# Patient Record
Sex: FEMALE | Race: WHITE | NOT HISPANIC OR LATINO | URBAN - METROPOLITAN AREA
[De-identification: names, ages, dates, MRNs, and addresses within clinical notes are randomized per-mention and may not be internally consistent; named-entity substitution may affect disease eponyms.]

---

## 2017-06-05 ENCOUNTER — HOSPITAL ENCOUNTER (OUTPATIENT)
Dept: PHYSICAL MEDICINE AND REHAB | Facility: CLINIC | Age: 32
Discharge: HOME | End: 2017-06-05
Attending: PHYSICAL MEDICINE & REHABILITATION

## 2017-06-05 DIAGNOSIS — M79.18 MYOFASCIAL MUSCLE PAIN: ICD-10-CM

## 2017-06-05 DIAGNOSIS — M25.552 HIP PAIN, BILATERAL: ICD-10-CM

## 2017-06-05 DIAGNOSIS — M51.369 DEGENERATIVE LUMBAR DISC: ICD-10-CM

## 2017-06-05 DIAGNOSIS — M54.50 LUMBAR SPINE PAIN: ICD-10-CM

## 2017-06-05 DIAGNOSIS — G47.9 SLEEP DIFFICULTIES: ICD-10-CM

## 2017-06-05 DIAGNOSIS — R20.2 LEFT LEG PARESTHESIAS: ICD-10-CM

## 2017-06-05 DIAGNOSIS — M25.551 HIP PAIN, BILATERAL: ICD-10-CM

## 2017-06-06 ENCOUNTER — RECORDS - HEALTHEAST (OUTPATIENT)
Dept: ADMINISTRATIVE | Facility: OTHER | Age: 32
End: 2017-06-06

## 2017-06-20 ENCOUNTER — HOSPITAL ENCOUNTER (OUTPATIENT)
Dept: RADIOLOGY | Facility: HOSPITAL | Age: 32
Discharge: HOME | End: 2017-06-20
Attending: PHYSICAL MEDICINE & REHABILITATION

## 2017-06-20 DIAGNOSIS — M54.50 LUMBAR SPINE PAIN: ICD-10-CM

## 2017-06-22 ENCOUNTER — COMMUNICATION - HEALTHEAST (OUTPATIENT)
Dept: PHYSICAL MEDICINE AND REHAB | Facility: CLINIC | Age: 32
End: 2017-06-22

## 2017-06-22 ENCOUNTER — HOSPITAL ENCOUNTER (OUTPATIENT)
Dept: PHYSICAL MEDICINE AND REHAB | Facility: CLINIC | Age: 32
Discharge: HOME | End: 2017-06-22
Attending: PHYSICAL MEDICINE & REHABILITATION

## 2017-06-22 DIAGNOSIS — R20.2 LEFT LEG PARESTHESIAS: ICD-10-CM

## 2017-06-22 DIAGNOSIS — G47.9 SLEEP DIFFICULTIES: ICD-10-CM

## 2017-06-22 DIAGNOSIS — M51.9 ANNULAR DISC TEAR: ICD-10-CM

## 2017-06-22 DIAGNOSIS — R20.2 NUMBNESS AND TINGLING IN LEFT ARM: ICD-10-CM

## 2017-06-22 DIAGNOSIS — M54.50 LUMBAR SPINE PAIN: ICD-10-CM

## 2017-06-22 DIAGNOSIS — M79.18 MYOFASCIAL PAIN: ICD-10-CM

## 2017-06-22 DIAGNOSIS — M51.369 DEGENERATIVE LUMBAR DISC: ICD-10-CM

## 2017-06-22 DIAGNOSIS — R20.0 NUMBNESS AND TINGLING IN LEFT ARM: ICD-10-CM

## 2017-06-22 ASSESSMENT — MIFFLIN-ST. JEOR: SCORE: 1642.5

## 2017-07-10 ENCOUNTER — RECORDS - HEALTHEAST (OUTPATIENT)
Dept: RADIOLOGY | Facility: CLINIC | Age: 32
End: 2017-07-10

## 2017-07-10 ENCOUNTER — OFFICE VISIT - HEALTHEAST (OUTPATIENT)
Dept: PHYSICAL THERAPY | Facility: REHABILITATION | Age: 32
End: 2017-07-10

## 2017-07-10 DIAGNOSIS — M51.9 ANNULAR DISC TEAR: ICD-10-CM

## 2017-07-10 DIAGNOSIS — M54.10 RADICULAR SYNDROME OF LOWER LIMBS: ICD-10-CM

## 2017-07-10 DIAGNOSIS — M51.369 DEGENERATIVE LUMBAR DISC: ICD-10-CM

## 2017-07-10 DIAGNOSIS — M51.9 ANNULAR TEAR OF INTERVERTEBRAL DISC: ICD-10-CM

## 2017-07-10 DIAGNOSIS — M54.50 LUMBAR SPINE PAIN: ICD-10-CM

## 2017-07-10 DIAGNOSIS — M79.18 MYOFASCIAL PAIN: ICD-10-CM

## 2017-07-10 DIAGNOSIS — M51.369 DEGENERATION OF LUMBAR INTERVERTEBRAL DISC: ICD-10-CM

## 2017-07-14 ENCOUNTER — HOSPITAL ENCOUNTER (OUTPATIENT)
Dept: PHYSICAL MEDICINE AND REHAB | Facility: CLINIC | Age: 32
Discharge: HOME | End: 2017-07-14
Attending: PHYSICAL MEDICINE & REHABILITATION

## 2017-07-14 DIAGNOSIS — G47.9 SLEEP DIFFICULTIES: ICD-10-CM

## 2017-07-17 ENCOUNTER — HOSPITAL ENCOUNTER (OUTPATIENT)
Dept: PHYSICAL MEDICINE AND REHAB | Facility: CLINIC | Age: 32
Discharge: HOME | End: 2017-07-17
Attending: PHYSICAL MEDICINE & REHABILITATION

## 2017-07-17 DIAGNOSIS — M51.369 DEGENERATIVE LUMBAR DISC: ICD-10-CM

## 2017-07-17 DIAGNOSIS — M99.03 SOMATIC DYSFUNCTION OF LUMBAR REGION: ICD-10-CM

## 2017-07-17 DIAGNOSIS — M99.06 SOMATIC DYSFUNCTION OF LOWER EXTREMITY: ICD-10-CM

## 2017-07-17 DIAGNOSIS — M99.05 SOMATIC DYSFUNCTION OF PELVIS REGION: ICD-10-CM

## 2017-07-17 DIAGNOSIS — M54.50 LUMBAR SPINE PAIN: ICD-10-CM

## 2017-07-17 DIAGNOSIS — M99.04 SOMATIC DYSFUNCTION OF SACROILIAC JOINT: ICD-10-CM

## 2017-07-17 DIAGNOSIS — M99.02 SOMATIC DYSFUNCTION OF THORACIC REGION: ICD-10-CM

## 2017-07-17 ASSESSMENT — MIFFLIN-ST. JEOR: SCORE: 1628.89

## 2017-07-19 ENCOUNTER — OFFICE VISIT - HEALTHEAST (OUTPATIENT)
Dept: PHYSICAL THERAPY | Facility: REHABILITATION | Age: 32
End: 2017-07-19

## 2017-07-19 DIAGNOSIS — M51.369 DEGENERATION OF LUMBAR INTERVERTEBRAL DISC: ICD-10-CM

## 2017-07-19 DIAGNOSIS — M54.10 RADICULAR SYNDROME OF LOWER LIMBS: ICD-10-CM

## 2017-07-19 DIAGNOSIS — M51.369 DEGENERATIVE LUMBAR DISC: ICD-10-CM

## 2017-07-19 DIAGNOSIS — M51.9 ANNULAR TEAR OF INTERVERTEBRAL DISC: ICD-10-CM

## 2017-07-19 DIAGNOSIS — M79.18 MYOFASCIAL PAIN: ICD-10-CM

## 2017-07-26 ENCOUNTER — OFFICE VISIT - HEALTHEAST (OUTPATIENT)
Dept: PHYSICAL THERAPY | Facility: REHABILITATION | Age: 32
End: 2017-07-26

## 2017-07-26 DIAGNOSIS — M51.369 DEGENERATIVE LUMBAR DISC: ICD-10-CM

## 2017-07-26 DIAGNOSIS — M51.369 DEGENERATION OF LUMBAR INTERVERTEBRAL DISC: ICD-10-CM

## 2017-07-26 DIAGNOSIS — M51.9 ANNULAR TEAR OF INTERVERTEBRAL DISC: ICD-10-CM

## 2017-07-26 DIAGNOSIS — M54.10 RADICULAR SYNDROME OF LOWER LIMBS: ICD-10-CM

## 2017-07-26 DIAGNOSIS — M79.18 MYOFASCIAL PAIN: ICD-10-CM

## 2017-08-04 ENCOUNTER — RECORDS - HEALTHEAST (OUTPATIENT)
Dept: RADIOLOGY | Facility: CLINIC | Age: 32
End: 2017-08-04

## 2017-08-09 ENCOUNTER — OFFICE VISIT - HEALTHEAST (OUTPATIENT)
Dept: PHYSICAL THERAPY | Facility: REHABILITATION | Age: 32
End: 2017-08-09

## 2017-08-09 DIAGNOSIS — M54.10 RADICULAR SYNDROME OF LOWER LIMBS: ICD-10-CM

## 2017-08-09 DIAGNOSIS — M79.18 MYOFASCIAL PAIN: ICD-10-CM

## 2017-08-09 DIAGNOSIS — M51.9 ANNULAR TEAR OF INTERVERTEBRAL DISC: ICD-10-CM

## 2017-08-09 DIAGNOSIS — M51.369 DEGENERATION OF LUMBAR INTERVERTEBRAL DISC: ICD-10-CM

## 2017-08-09 DIAGNOSIS — M51.369 DEGENERATIVE LUMBAR DISC: ICD-10-CM

## 2017-08-23 ENCOUNTER — OFFICE VISIT - HEALTHEAST (OUTPATIENT)
Dept: PHYSICAL THERAPY | Facility: REHABILITATION | Age: 32
End: 2017-08-23

## 2017-08-23 ENCOUNTER — COMMUNICATION - HEALTHEAST (OUTPATIENT)
Dept: PHYSICAL MEDICINE AND REHAB | Facility: CLINIC | Age: 32
End: 2017-08-23

## 2017-08-23 DIAGNOSIS — M79.18 MYOFASCIAL PAIN: ICD-10-CM

## 2017-08-23 DIAGNOSIS — M51.9 ANNULAR TEAR OF INTERVERTEBRAL DISC: ICD-10-CM

## 2017-08-23 DIAGNOSIS — M51.369 DEGENERATION OF LUMBAR INTERVERTEBRAL DISC: ICD-10-CM

## 2017-08-23 DIAGNOSIS — M54.10 RADICULAR SYNDROME OF LOWER LIMBS: ICD-10-CM

## 2017-08-23 DIAGNOSIS — G47.9 SLEEP DIFFICULTIES: ICD-10-CM

## 2017-08-23 DIAGNOSIS — M51.369 DEGENERATIVE LUMBAR DISC: ICD-10-CM

## 2017-08-23 DIAGNOSIS — M51.9 ANNULAR DISC TEAR: ICD-10-CM

## 2017-09-06 ENCOUNTER — COMMUNICATION - HEALTHEAST (OUTPATIENT)
Dept: PHYSICAL MEDICINE AND REHAB | Facility: CLINIC | Age: 32
End: 2017-09-06

## 2017-09-07 ENCOUNTER — HOSPITAL ENCOUNTER (OUTPATIENT)
Dept: PHYSICAL MEDICINE AND REHAB | Facility: CLINIC | Age: 32
Discharge: HOME | End: 2017-09-07
Attending: PHYSICAL MEDICINE & REHABILITATION

## 2017-09-07 DIAGNOSIS — M99.06 SOMATIC DYSFUNCTION OF LOWER EXTREMITY: ICD-10-CM

## 2017-09-07 DIAGNOSIS — M99.04 SOMATIC DYSFUNCTION OF SACROILIAC JOINT: ICD-10-CM

## 2017-09-07 DIAGNOSIS — M99.05 SOMATIC DYSFUNCTION OF PELVIS REGION: ICD-10-CM

## 2017-09-07 DIAGNOSIS — M99.03 SOMATIC DYSFUNCTION OF LUMBAR REGION: ICD-10-CM

## 2017-09-07 DIAGNOSIS — M99.02 SOMATIC DYSFUNCTION OF THORACIC REGION: ICD-10-CM

## 2017-09-07 DIAGNOSIS — M79.18 MYOFASCIAL MUSCLE PAIN: ICD-10-CM

## 2017-09-07 DIAGNOSIS — M54.50 LUMBAR SPINE PAIN: ICD-10-CM

## 2017-09-07 DIAGNOSIS — M51.369 DEGENERATIVE LUMBAR DISC: ICD-10-CM

## 2017-09-07 ASSESSMENT — MIFFLIN-ST. JEOR: SCORE: 1628.89

## 2017-09-27 ENCOUNTER — OFFICE VISIT - HEALTHEAST (OUTPATIENT)
Dept: PHYSICAL THERAPY | Facility: REHABILITATION | Age: 32
End: 2017-09-27

## 2017-09-27 DIAGNOSIS — M51.9 ANNULAR TEAR OF INTERVERTEBRAL DISC: ICD-10-CM

## 2017-09-27 DIAGNOSIS — M79.18 MYOFASCIAL MUSCLE PAIN: ICD-10-CM

## 2017-09-27 DIAGNOSIS — M51.369 DEGENERATIVE LUMBAR DISC: ICD-10-CM

## 2017-09-27 DIAGNOSIS — M54.10 RADICULAR SYNDROME OF LOWER LIMBS: ICD-10-CM

## 2017-09-27 DIAGNOSIS — M51.369 DEGENERATION OF LUMBAR INTERVERTEBRAL DISC: ICD-10-CM

## 2017-09-27 DIAGNOSIS — M79.18 MYOFASCIAL PAIN: ICD-10-CM

## 2017-09-27 DIAGNOSIS — M54.50 LUMBAR SPINE PAIN: ICD-10-CM

## 2017-10-04 ENCOUNTER — OFFICE VISIT - HEALTHEAST (OUTPATIENT)
Dept: PHYSICAL THERAPY | Facility: REHABILITATION | Age: 32
End: 2017-10-04

## 2017-10-04 DIAGNOSIS — M51.369 DEGENERATION OF LUMBAR INTERVERTEBRAL DISC: ICD-10-CM

## 2017-10-04 DIAGNOSIS — M51.9 ANNULAR TEAR OF INTERVERTEBRAL DISC: ICD-10-CM

## 2017-10-04 DIAGNOSIS — M54.10 RADICULAR SYNDROME OF LOWER LIMBS: ICD-10-CM

## 2017-10-04 DIAGNOSIS — M79.18 MYOFASCIAL PAIN: ICD-10-CM

## 2017-10-04 DIAGNOSIS — M51.369 DEGENERATIVE LUMBAR DISC: ICD-10-CM

## 2017-10-11 ENCOUNTER — OFFICE VISIT - HEALTHEAST (OUTPATIENT)
Dept: PHYSICAL THERAPY | Facility: REHABILITATION | Age: 32
End: 2017-10-11

## 2017-10-11 DIAGNOSIS — M54.10 RADICULAR SYNDROME OF LOWER LIMBS: ICD-10-CM

## 2017-10-11 DIAGNOSIS — M51.9 ANNULAR TEAR OF INTERVERTEBRAL DISC: ICD-10-CM

## 2017-10-11 DIAGNOSIS — M51.369 DEGENERATION OF LUMBAR INTERVERTEBRAL DISC: ICD-10-CM

## 2017-10-11 DIAGNOSIS — M79.18 MYOFASCIAL PAIN: ICD-10-CM

## 2017-10-25 ENCOUNTER — OFFICE VISIT - HEALTHEAST (OUTPATIENT)
Dept: PHYSICAL THERAPY | Facility: REHABILITATION | Age: 32
End: 2017-10-25

## 2017-10-25 DIAGNOSIS — M51.9 ANNULAR TEAR OF INTERVERTEBRAL DISC: ICD-10-CM

## 2017-10-25 DIAGNOSIS — M54.10 RADICULAR SYNDROME OF LOWER LIMBS: ICD-10-CM

## 2017-10-25 DIAGNOSIS — M79.18 MYOFASCIAL PAIN: ICD-10-CM

## 2017-10-25 DIAGNOSIS — M51.369 DEGENERATION OF LUMBAR INTERVERTEBRAL DISC: ICD-10-CM

## 2020-07-21 ENCOUNTER — AMBULATORY - HEALTHEAST (OUTPATIENT)
Dept: SURGERY | Facility: CLINIC | Age: 35
End: 2020-07-21

## 2020-07-21 ENCOUNTER — RECORDS - HEALTHEAST (OUTPATIENT)
Dept: ADMINISTRATIVE | Facility: OTHER | Age: 35
End: 2020-07-21

## 2020-07-21 DIAGNOSIS — Z11.59 ENCOUNTER FOR SCREENING FOR OTHER VIRAL DISEASES: ICD-10-CM

## 2021-05-27 ENCOUNTER — RECORDS - HEALTHEAST (OUTPATIENT)
Dept: ADMINISTRATIVE | Facility: CLINIC | Age: 36
End: 2021-05-27

## 2021-05-29 ENCOUNTER — RECORDS - HEALTHEAST (OUTPATIENT)
Dept: ADMINISTRATIVE | Facility: CLINIC | Age: 36
End: 2021-05-29

## 2021-05-30 ENCOUNTER — RECORDS - HEALTHEAST (OUTPATIENT)
Dept: ADMINISTRATIVE | Facility: CLINIC | Age: 36
End: 2021-05-30

## 2021-05-30 VITALS — WEIGHT: 198 LBS | BODY MASS INDEX: 29.24 KG/M2

## 2021-05-31 VITALS — WEIGHT: 195 LBS | HEIGHT: 69 IN | BODY MASS INDEX: 28.88 KG/M2

## 2021-05-31 VITALS — BODY MASS INDEX: 28.88 KG/M2 | HEIGHT: 69 IN | WEIGHT: 195 LBS

## 2021-05-31 VITALS — WEIGHT: 198 LBS | HEIGHT: 69 IN | BODY MASS INDEX: 29.33 KG/M2

## 2021-06-11 NOTE — PROGRESS NOTES
[facility]   Spine Initial Evaluation    Patient Name: Casandra Hopkins  Date of evaluation: 7/10/2017  Date of surgery: 9/2012  Referral Diagnosis: Left sided low back and leg pain with annular tear  Referring provider: Antoni Enamorado DO  Visit Diagnosis:     ICD-10-CM    1. Radicular syndrome of lower limbs M54.10    2. Degeneration of lumbar intervertebral disc M51.36    3. Myofascial pain M79.1    4. Annular tear of intervertebral disc M51.9    5. Annular disc tear M51.9    6. Degenerative lumbar disc M51.36    7. Lumbar spine pain M54.5        Assessment:      Impairments in  pain, posture, ROM, joint mobility, strength, ADL's  Patient's signs and symptoms are consistent with Lumbar nerve derangemnt with postural dysfunction.  The POC is dynamic and will be modified on an ongoing basis.  Prognosis to achieve goals is  good     Goals:  Pt. will be independent with home exercise program in : 12 weeks  Pt. will have improved quality of sleep: waking less times/night;in 12 weeks  Patient will ascend / descend: stairs;with railing;with less pain;with less difficulty;in 12 weeks  Patient will transfer: sit/stand;supine/sit;for in/out of bed;for in/out of chair;with less pain;with less difficulty;in 12 weeks  No Data Recorded    Patient's expectations/goals are realistic    Barriers to Learning or Achieving Goals:  No Barriers.       Plan / Patient Instructions:        Plan of Care:   Communication with: Referral Source  Patient Related Instruction: Nature of Condition;Treatment plan and rationale;Basis of treatment;Posture;Expected outcome  Times per Week: 1-2  Number of Weeks: 12  Number of Visits: 12-14  Discharge Planning: independent in [facility]  Therapeutic Exercise: ROM;Stretching;Strengthening  Neuromuscular Reeducation: kinesio tape;balance/proprioception;TNE;postural restoration;core  Manual Therapy: soft tissue mobilization;myofascial release;joint mobilization  Modalities: electrical  "stimulation;TENS;cold pack;hot pack    Plan for next visit: Continue reduction in nerve tension, LA as able, prefers flexion based exercises     Subjective:         History of Present Illness:    Patient is a 32 year old female seen today with complaints of low back and left lower extremity pain.  Date of onset was insidious occurring on or around 9/2012 related to insidious onset of back pain after getting out of her car and having immediate spine surgery with Dr. Alaina vaughn 2. She reports a history of similar symptoms with multiple trials of PT including Med[facility], dry needling and HEP. She describes her previous level of function as normal until the surgery. Patient reports aggravating factors as sitting greater then 15-30 minutes or standing 15 minutes. Reports easing factors as \"moving around or short distance walking. Medications that arenot helpful as Topamax for nerve pain.    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    Patient Outcome Measures :    Modified Oswestry Low Back Pain Disablity Questionnaire  in %: 60   Scores range from 0-100%, where a score of 0% represents minimal pain and maximal function. The minimal clinically important difference is a score reduction of 12%.    Examination  1. Radicular syndrome of lower limbs     2. Degeneration of lumbar intervertebral disc     3. Myofascial pain     4. Annular tear of intervertebral disc     5. Annular disc tear     6. Degenerative lumbar disc     7. Lumbar spine pain       Precautions/Restrictions: None  Involved side: Left and Bilateral  Posture Observation: Decreased side stepping with PDM to left, decreased weight bearing left    Lumbar ROM:    Date:  "   *Indicate scale AROM   Lumbar Flexion 8 inches from floor   Lumbar Extension mod    Right Left   Lumbar Sidebending  <   Lumbar Rotation     Thoracic Flexion    Thoracic Extension    Thoracic Sidebending     Thoracic Rotation       Directional Preference Motion Testing:  flexion    Lower Extremity Strength:     Date:    LE strength/5 Right Left   Hip Flexion (L1-3) 5 5   Hip Extension (L5-S1)     Hip Abduction (L4-5) 5 5   Hip Adduction (L2-3)     Hip External Rotation 4 4   Hip Internal Rotation 4 3+   Knee Extension (L3-4) 5 4   Knee Flexion     Ankle Dorsiflexion (L4-5) 5 4   Great Toe Extension (L5)     Ankle Plantar flexion (S1)     Abdominals Grade        Lumbar Special Tests:     Lumbar Special Tests Right Left W[place] Tests Right  Left   Quadrant test   FAIR PROM 40 30   Gowers sign  + FAER PROM 50 36   Slump  + Adduction Drop test     Straight leg raise 70 65 Modified Irving     Crossover sign  Adduction Lift Test     Trunk extensor endurance test  Trunk Rotation     Prone instability test  Other:FABERE     SI distraction + Other:SLS/rotate       Sensation                      Reflex Testing/Neurological  Lumbar Dermatomes Right Left UE Reflexes Right Left   Iliac Crest and Groin (L1)   Biceps (C5-6)     Anterior Medial Thigh (L2)   Brachioradialis (C5-6)     Anterior Thigh, Medial Epicondyle Femur (L3)   Triceps (C7-8)     Lateral Thigh, Anterior Knee, Medial Leg/Malleolus (L4)   Román s test     Lateral Leg, Dorsal Foot (L5)   LE Reflexes     Lateral Foot (S1)   Patellar (L3-4) 2+ 2+   Posterior Leg (S2)   Achilles (S1-2) 2+ delayed   Other:Myotomes   Babinski Response       Palpation:+ left finger point test to SI   Exercises:  Exercise #1: supine,seated standing nerve sliders  Comment #1: 5-8 reps every two hours  Exercise #2: Sleep positions Supine or S/L with hip shift  Comment #2: sitting on SB at work        Treatment Today     TREATMENT MINUTES COMMENTS   Evaluation 25 Lumbar   Self-care/ Home  management 15 Sleeping positions and SB at work   Manual therapy 5 Manual lumbar LLE oscillations with nerve sliders, adduction, nerve hypersensitive with DF   Neuromuscular Re-education 15 Supine, seated, standing nerve sliders   Therapeutic Activity     Therapeutic Exercises     Gait training     Modality__________________                Total 60    Blank areas are intentional and mean the treatment did not include these items.              Cheryl Borges  7/10/2017  9:00 AM        PT Evaluation Code: (Please list factors)  Patient History/Comorbidities: lumbar radiculopathy with surgery x 2,   Examination: pain, impaired gait, decreased strength and ROM, nerve tension  Clinical Presentation: stable  Clinical Decision Making: low    Patient History/  Comorbidities Examination  (body structures and functions, activity limitations, and/or participation restrictions) Clinical Presentation Clinical Decision Making (Complexity)   No documented Comorbidities or personal factors 1-2 Elements Stable and/or uncomplicated Low   1-2 documented comorbidities or personal factor 3 Elements Evolving clinical presentation with changing characteristics Moderate   3-4 documented comorbidities or personal factors 4 or more Unstable and unpredictable High

## 2021-06-11 NOTE — PROGRESS NOTES
Patient presents for left lower extremity EMG.  She has lumbar spine pain had multiple surgeries.  She has gluteal and low back pain with left leg pain and paresthesias to the foot.  Status post L5-S1 microdiscectomy.    EMG/NCS  results: Please see scanned full report.    Comment NCS: Normal study  1.  Normal nerve conduction studies left lower extremity.    Comment EMG: Essentially study  1.  Normal needle EMG left lower extremity.  There are some borderline motor unit durations in the left medial gastrocnemius muscle.       Interpretation: Essentially study    1. There is no electrodiagnostic evidence of active lumbosacral radiculopathy, lumbosacral plexopathy, or focal neuropathy in the left lower extremity.  There are some borderline long motor unit durations of left medial gastrocnemius muscle.  This can be normal for the patient or could be consistent with a chronic and inactive S1 radiculopathy.    The testing was completed in its entirety by the physician.      It was our pleasure caring for your patient today, if there any questions or concerns please do not hesitate to contact us.

## 2021-06-11 NOTE — PROGRESS NOTES
New patient evaluation  --Hx of 2 lumbar surgery L5-S1 9/2012 and 12/20/12 by Dr. Diaz  --Pt also reports of getting multiple lumbar KRISHNA and multiple RF with [facility] and [facility]  --Today C/O mid low back pain, radiates down the B/L buttocks with pain/numbness in the entire left posterior leg to the left foot (ongoing since 2012), progressively  worsening x 1 month  --Rates pain 5/10  --Most recent MRI lumbar spine 10/27/16 with [facility]. [facility] Everywhere consent signed.   --See [facility] records in the media tab    Medication  --No pain meds now

## 2021-06-11 NOTE — PROGRESS NOTES
Patient arrived on time for her intake with this provider. As I described purpose of intake, she reported that she has a current psychotherapist whom she sees every other week and that they work on her depression, anxiety, and the intersection with her pain. She reports she is also taking psychotropic medication. Described the type of approaches provided here. Her current psychotherapist already does breathing, mindfulness, and EMDR with her and she wants to keep working with that therapist. Decided to not pursue the intake at this time. Provided her with handouts on the pain management series of classes, mindfulness, square breathing, and grounding skills. She is aware that if in the future she wants to change psychotherapists, she is more than welcome to reschedule this intake with this provider.    Time with patient: 12 minutes    No charge to patient.

## 2021-06-11 NOTE — PROGRESS NOTES
Assessment/Plan:      Diagnoses and all orders for this visit:    Lumbar spine pain    Degenerative lumbar disc    Somatic dysfunction of thoracic region    Somatic dysfunction of lumbar region    Somatic dysfunction of sacroiliac joint    Somatic dysfunction of pelvis region    Somatic dysfunction of lower extremity        Assessment:    1.  Chronic lumbar spine pain and left lower extremity pain and paresthesias.  Status post hemilaminectomy with microdiscectomy in September 2012 followed by recurrent disc herniation in December 2012 by Dr. Oswald.  Persistent pain since.   She does have degenerative disc disease at L4-5 and L5-S1 with annular tear at L4-5.  Some moderate foraminal stenosis on the left at L5-S1 no significant nerve compression at any level on MRI from October 2016.  Has had numerous treatments in the past including [facility] physical therapy, multiple injections including transforaminal epidurals, medial branch blocks, SI joints.  Wants to avoid surgery.       2.  Somatic dysfunctions of the thoracic spine, lumbar spine, sacrum, pelvis, lower extremities that contribute to the patient's pain complaints.   3. Pain across the lumbar spine gluteal region consistent with myofascial pain  4.  Poor sleep related to pain, stable      Discussion:    1.  We discussed diagnosis and treatment options.   we discussed her starting physical therapy and should continue for 4-6 visits.  She is doing some nerve glides.  2.  She had questions regarding home exercise.  This includes yoga or Pilates.  She will start 1 of these programs. I did discuss that modifying the exercises may be necessary for her.  3.  Continue Topamax.  She was taking this twice daily because side effects.  Will continue at 25 mg at bedtime.  4.  Osteopathic manipulative medicine today as planned.  Patient agrees to proceed.  Please see attached procedure note.    5.  Return to clinic 2-4 weeks    15 minutes were spent with this patient in  addition to any procedure with greater than 10 minutes in counseling and coordination of care.    It was our pleasure caring for your patient today, if there any questions or concerns please do not hesitate to contact us.      Subjective:   Patient ID: Casandra Hopkins is a 32 y.o. female.    History of Present Illness: Patient presents for evaluation of lumbar spine pain left leg pain and gluteal pain.  Symptoms have worsened some since last visit.  Pain is now across the gluteal tissues along the iliac crest.  Still has the pain on the left leg with numbness and tingling.  Pain is a 3/10 today 9/10 at worst.  Has been taking Topamax 25 mg at bedtime.  Increase this to twice a day and was having cognitive side effects during the day wondering if she can continue just at bedtime.  She does feel that the Topamax is quite helpful for her tingling in the leg.  She also has questions today regarding exercise.  She wants to start yoga or Pilates and is wondering what the best course would be.    She has started physical therapy doing some nerve glides and is scheduled for another 4-6 visits.  She is here for OMT today but has these questions as above.  Would like to proceed with OMT.    Imaging: MRI of the lumbar spine personally reviewed.  Degenerative disc disease L4-5 and L5-S1 with annular tear at L4-5.  Moderate foraminal stenosis on the left at L5-S1.    Review of systems: Some numbness and tingling left leg and weakness.  No bowel or bladder incontinence headache dizziness nausea vomiting blurred vision or balance changes.      Past Medical History:   Diagnosis Date     Anxiety      Depression        The following portions of the patient's history were reviewed and updated as appropriate: allergies, current medications, past family history, past medical history, past social history, past surgical history and problem list.      Objective:   Physical Exam:    Vitals:    07/17/17 0851   BP: 111/68   Pulse: 87        General: Alert and oriented with normal affect. Attention, knowledge, memory, and language are intact. No acute distress.   Eyes: Sclerae are clear.  Respirations: Unlabored. CV: No lower extremity edema.   Gait:  Nonantalgic    Sensation is intact to light touch throughout the   lower extremities.       Manual muscle testing reveals:  Right /Left out of 5     5/5 ankle plantar flexors  5/5 ankle dorsiflexors  5/5  EHL     Structural exam: . Thoracic spine  T12 rotated right sidebent right. Lumbar spine: L5 rotated left sidebent right. Pelvis: Right in flare, right innominate upslip, anterior inferior right innominate. Sacrum: Left on left forward sacral torsion. Lower extremity: Hypertonic hip flexors bilaterally left greater than right ,    Procedure:    After discussing the risks and benefits of osteopathic manipulative medicine, verbal consent was obtained. The somatic dysfunctions listed above were treated with the following techniques:   Thoracic spine: Muscle energy.  Lumbar spine: Muscle energy, soft tissue. Pelvis: Still technique and Isometrics, muscle energy. Sacrum: Myofascial release.  Lower extremities: Muscle energy.    The patient tolerated the procedure well and had improved range of motion in all areas treated prior to leaving the clinic.

## 2021-06-11 NOTE — PROGRESS NOTES
Assessment/Plan:      Diagnoses and all orders for this visit:    Lumbar spine pain  -     EMG - Clinic; Future; Expected date: 6/5/17  -     XR Lumbar Spine Flex and Ext 2 or 3 VWS; Future; Expected date: 6/5/17    Degenerative lumbar disc  -     EMG - Clinic; Future; Expected date: 6/5/17    Hip pain, bilateral    Myofascial muscle pain    Sleep difficulties  -     Ambulatory referral to [facility]    Left leg paresthesias  -     EMG - Clinic; Future; Expected date: 6/5/17        Assessment: Very pleasant 32-year-old female with history of depression and anxiety with:    1.  Chronic lumbar spine pain and left lower extremity pain and paresthesias.  Status post hemilaminectomy with microdiscectomy in September 2012 followed by recurrent disc herniation in December 2012 by Dr. Oswald.  Persistent pain since.   She does have degenerative disc disease at L4-5 and L5-S1 with annular tear at L4-5.  Some moderate foraminal stenosis on the left at L5-S1 no significant nerve compression at any level on old MRI.      2.  Pain across the lumbar spine gluteal region consistent with myofascial pain  3.  Some Pain in the right gluteal region and hip with scour maneuver today and there may be a component of hip pathology.  Much less pain in the left with provocative maneuvers.  4.  Poor sleep related to pain    Discussion:    1.  I discussed the diagnoses and treatment options.  She has had numerous treatments in the past at [facility].  Recent physical therapy has been done 5 months ago.  Despite this she is having persistent pain in the low back and down the left leg with paresthesias.  2.  Flexion extension x-rays of lumbar spine.  3.  We will obtain MRI images of lumbar spine from [facility] along with any pelvic x-rays that have been done.  4.  EMG will be done of her left lower extremity to further evaluate.  5.  Start behavioral health for education and treatment of  chronic  pain and sleep issues.  6.  Follow-up after EMG to discuss.  Will also sort through all of the records of injections that have been done to compile a thorough list of procedures.    It was our pleasure caring for your patient today, if there any questions or concerns please do not hesitate to contact us.    Record review: [facility] notes:  October 2012: Left L5-S1 transforaminal epidural steroid injection  2.  Right S1 selective nerve root block November 2012  3.  Left SI joint injection May 2015 had also been seen by Dr. Oh who recommended referral to Dr. Walton.  Dr. Walton recommended's fusion per Dr. Oh's notes  4.  Left L5-S1 transforaminal epidural steroid injection October 2012 at [facility]  5.  Bilateral L3, 4, 5 medial branch blocks done 2014    Addendum: June 8, 2017: [facility]:  1.  May 13, 2016 bilateral L5, S1, 2, 3 medial branch blocks with 80% pain.  2.  May 27, 2016: Bilateral L5, S1, 2, 3 medial branch blocks with 60% pain relief.  3.  July 22, 2016: Bilateral L5, S1, S2, S3 radiofrequency ablation  4.  November 4, 2016: Bilateral L5 transforaminal epidural steroid injection.    5.  Flexion-extension plain films reveal no instability performed December 15, 2015.  This is radiology report.  6.  Radiology report MRI lumbar spine October 27, 2016: Left laminotomy and moderate L5-S1 interbody degenerative changes.  Mild to moderate bilateral L5-S1 neural foraminal stenosis.  Mild L4-5 ventral spinal canal stenosis with mild neuroforaminal  Stenosis.      Subjective:   Patient ID: Casandra Hopkins is a 32 y.o. female.    History of Present Illness: The patient presents as a self-referral for evaluation of chronic low back pain bilateral gluteal pain and left leg pain and paresthesias.  Her history dates back to September 11, 2012.  Was getting out of the car and had severe pain.  Had emergency surgery by Dr. Oswald with microdiscectomy at L5-S1.  Was doing okay  until December of that year and had second surgery for reherniation in December 2012.  No real change in her symptoms over the past 5 years.  She is seen numerous providers.  She was initially seeing [facility] has had numerous physical therapy along with injections that she reports including epidurals and SI joint injections and even potential radiofrequency ablation.  Then when they said they were recommending fusion of the spine she went to health [facility] and I was unable to review records today from them but she reports having injections there as well including SI joint injections.    Her pain is across lumbosacral junction bilateral PSIS gluteal region she has numbness and tingling shooting pain down the back of the left leg to the calf and under the left foot.  Worse with any activity for any period of time such as sitting for over 5 minutes.  Better with changing positions.  Constant numbness and tingling down the left leg had no current bowel or bladder issues.  Feels weak in her left leg.    She reports doing recent physical therapy at health [facility] within the last 5 months.  Did physician's neck and back with [facility] in the past.  She does not take any medications on a daily basis.  She has a difficult time sleeping.  She wants to avoid surgery and fusion if possible.  She has 2 young children age 11 and 6 at home.  She continues to work as a dental .      Imaging: MRI of the lumbar spine from August 2015 personally reviewed discussed.  MRI report from 2016 reviewed.  Degenerative disc disease L4-5 and L5-S1 with annular tear at L4-5.  No central stenosis.  Previous left L5 hemilaminectomy.   moderate foraminal stenosis at L5-S1.    Review of Systems: Complains of anxiety, constipation, easy bruising, sleep issues, indigestion, back pain, joint pain, leg pain and depression. Remainder of 12 point review systems negative unless listed above.    Past Medical History:   Diagnosis Date      Anxiety      Depression        The following portions of the patient's history were reviewed and updated as appropriate: allergies, current medications, past family history, past medical history, past social history, past surgical history and problem list.      WHO 5: 7    KAREN Score: 50      Objective:   Physical Exam:    Vitals:    06/05/17 0949   BP: 121/80   Pulse: 85   Temp: 98.3  F (36.8  C)   SpO2: 100%       General:  Well-appearing female in no acute distress.  Pleasant, cooperative, and interactive throughout the examination and interview.  CV: No lower extremity edema on inspection or paltation.  Lymphatics: No cervical lymphadenopathy palpated. Eyes: sclera clear. Skin: No rashes or lesions seen over the head/neck, hairline, arms, legs, trunk.  Respirations unlabored.  MSK: Gait is normal.  Able to heel-toe walk without difficulty.  Negative Romberg.  Spine: normal AP curves of the C, T, and L spine.  Mild decreased lumbar spine range of motion flexion secondary to pain. .  Palpation: Mild tenderness palpation throughout the lumbar paraspinals PSIS gluteal tissues.  Extremities: Full range of motion of the elbows, and wrists with no effusions or tenderness to palpation.   Full range of motion of the hips, knees, and ankles with no effusions or tenderness to palpation.  Pain in the right with scour maneuver in the gluteal region.  Negative Gelacio's test bilaterally.  Minimal to no pain in the left with scour maneuver.  No hypermobility of the upper or lower extremities.  Neurologic exam: Mental status: Patient is alert and oriented with normal affect.  Attention, knowledge, memory, and language are intact.  Normal coordination throughout the examination.  Reflexes are 2+ and symmetric biceps, triceps, brachioradialis, patellar, and Achilles with down-going toes and Negative Román's.  Sensation is intact to light touch throughout the upper and lower extremities bilaterally.  Manual muscle testing  reveals 5 out of 5 in the hip flexors, knee flexors/extensors, ankle plantar flexors, ankle  dorsiflexors, and EHL.  Upper extremities: Grossly normal strength . Normal muscle bulk and tone in the arms and legs.    Negative seated and supine straight leg raise bilaterally.

## 2021-06-11 NOTE — PROGRESS NOTES
[facility] Daily Progress     Patient Name: Casandra Hopkins  Date: 7/19/2017  Visit #: 2  PTA visit #:    Referral Diagnosis: Annular tear  Referring provider: Antoni Enamorado DO  Visit Diagnosis:     ICD-10-CM    1. Radicular syndrome of lower limbs M54.10    2. Degeneration of lumbar intervertebral disc M51.36    3. Myofascial pain M79.1    4. Annular tear of intervertebral disc M51.9    5. Degenerative lumbar disc M51.36          Assessment:   Patient is a PEC postural pattern with BC restrictions, able to reposition thorax/ribs and assist pelvic obliquity with exercises today. Anticipate soreness with activity and patient was cautioned against doing too much quickly.   HEP/POC compliance is  good .  Patient is appropriate to continue with skilled physical therapy intervention, as indicated by initial plan of care.    Goal Status:  Pt. will be independent with home exercise program in : 12 weeks  Pt. will have improved quality of sleep: waking less times/night;in 12 weeks  Patient will ascend / descend: stairs;with railing;with less pain;with less difficulty;in 12 weeks  Patient will transfer: sit/stand;supine/sit;for in/out of bed;for in/out of chair;with less pain;with less difficulty;in 12 weeks  No Data Recorded    Plan / Patient Education:     Continue with initial plan of care.  Progress with home program as tolerated.    Subjective:     Pain Rating: Hurt after evaluation, always hurts though, not too bad right now  Hips were adjusted and felt better when left Dr. Enamorado but now out again. Doing nerve sliders but feels like it makes my back hurt.  Considering yoga or pilates    Objective:   Bilateral HGIR limitations corrected to 90 with hip lift/balloon, right arm reach and left adductor, with bilateral elevated rib cage and rib flare.  Establishing HEP with correct muscle recruitment for lumbo-pelvic obliquity with min cues      Treatment Today     TREATMENT MINUTES COMMENTS   Evaluation      Self-care/ Home management 10 L S/L over pillow for sleep   Manual therapy     Neuromuscular Re-education 30 MD exercises for muscle recruitment and hip positioning, reduce paradoxical breathing   Therapeutic Activity     Therapeutic Exercises 15 See HEP sequence   Gait training     Modality__________________                Total     Blank areas are intentional and mean the treatment did not include these items.       Cheryl Borges  7/19/2017

## 2021-06-12 NOTE — PROGRESS NOTES
[facility] Daily Progress     Patient Name: Casandra Hopkins  Date: 8/23/2017   Visit #: 5  PTA visit #:    Referral Diagnosis: Annular tear  Referring provider: Antoni Enamorado DO  Visit Diagnosis:     ICD-10-CM    1. Radicular syndrome of lower limbs M54.10    2. Degeneration of lumbar intervertebral disc M51.36    3. Myofascial pain M79.1    4. Annular tear of intervertebral disc M51.9    5. Degenerative lumbar disc M51.36          Assessment:   Patient is a PEC postural pattern with BC restrictions, able to reposition thorax/ribs and assist pelvic obliquity with manual and exercises today. Anticipate soreness with activity and patient was cautioned against doing too much quickly. Sore but safe philosophy and managing stress levels. Patient did have reductio in pain at end of session.  HEP/POC compliance is  good .  Patient is appropriate to continue with skilled physical therapy intervention, as indicated by initial plan of care.    Goal Status:  Pt. will be independent with home exercise program in : 12 weeks  Pt. will have improved quality of sleep: waking less times/night;in 12 weeks  Patient will ascend / descend: stairs;with railing;with less pain;with less difficulty;in 12 weeks  Patient will transfer: sit/stand;supine/sit;for in/out of bed;for in/out of chair;with less pain;with less difficulty;in 12 weeks  No Data Recorded    Plan / Patient Education:     Continue with initial plan of care.  Progress with home program as tolerated. continue MI as tolerated. May consider orthotics in future. All fours belly lift, abdominals and yoga postures for safe mobility    Subjective:     Pain Rating: rates LB pain medium to high, just finished work. High levels with stress and mother falling and breaking arm. Considering yoga or pilates    Objective:   Manual BC Technique- Supine 90/90 guiding ribs/thorax, fascia to zone of apposition with breath  Sternal with pelvic tilt  Infraclavicular  pump  Lumbar/glute MWM and STM or desensitization  Cat/cow with lukas pose x 5 reps  Hip lift with balloon  Table top abdominals with bolster      Review of sequence of exercises and HEP with correct muscle recruitment for lumbo-pelvic obliquity with min cues, patient noted glute and calf pain or burning with S/L glute exercises  Exercises:  Exercise #1: supine,seated standing nerve sliders  Comment #1: 5-8 reps every two hours  Exercise #2: Sleep positions Supine or S/L with hip shift  Comment #2: sitting on SB at work  Exercise #3: supine hip lift with/without balloon  Comment #3: abdominal marching 90/90 with rotation OR modifoed belly lift  Exercise #4: R S/L left glte medius and adductor per h/o  Comment #4: L S/L right glute max  Exercise #5: S/L hip lift  Comment #5: supine synchronized glutes and breathing      Treatment Today     TREATMENT MINUTES COMMENTS   Evaluation     Self-care/ Home management 3 BEHZAD principles of sitting and issue of footwear recommendation, has low to no arches-kinesio-tape to low back/SI instructed   Manual therapy  As above   Neuromuscular Re-education 15 WI exercises for muscle recruitment and hip positioning, reduce paradoxical breathing   Therapeutic Activity     Therapeutic Exercises 25 See HEP sequence   Gait training     Modality__________________                Total 43    Blank areas are intentional and mean the treatment did not include these items.       Cheryl Borges, PT  8/23/2017

## 2021-06-12 NOTE — PROGRESS NOTES
[facility] Daily Progress     Patient Name: Casandra Hopkins  Date: 8/9/2017   Visit #: 4  PTA visit #:    Referral Diagnosis: Annular tear  Referring provider: Antoni Enamorado DO  Visit Diagnosis:     ICD-10-CM    1. Radicular syndrome of lower limbs M54.10    2. Degeneration of lumbar intervertebral disc M51.36    3. Myofascial pain M79.1    4. Annular tear of intervertebral disc M51.9    5. Degenerative lumbar disc M51.36          Assessment:   Patient is a PEC postural pattern with BC restrictions, able to reposition thorax/ribs and assist pelvic obliquity with manual and exercises today. Anticipate soreness with activity and patient was cautioned against doing too much quickly. Sore but safe philosophy and managing stress levels.  HEP/POC compliance is  good .  Patient is appropriate to continue with skilled physical therapy intervention, as indicated by initial plan of care.    Goal Status:  Pt. will be independent with home exercise program in : 12 weeks  Pt. will have improved quality of sleep: waking less times/night;in 12 weeks  Patient will ascend / descend: stairs;with railing;with less pain;with less difficulty;in 12 weeks  Patient will transfer: sit/stand;supine/sit;for in/out of bed;for in/out of chair;with less pain;with less difficulty;in 12 weeks  No Data Recorded    Plan / Patient Education:     Continue with initial plan of care.  Progress with home program as tolerated. continue [place] as tolerated. May consider orthotics in future.    Subjective:     Pain Rating: rates LB pain medium to high, just finished work. High levels with stress and mother falling and breaking arm. Considering yoga or pilates    Objective:   Manual BC Technique- Supine 90/90 guiding ribs/thorax, fascia to zone of apposition with breath  AIC  Sternal with pelvic tilt  Superior T4  Infraclavicular pump  Ice tong lift  Lumbar/glute MWM and STM or desensitization    Review of sequence of exercises and HEP with  correct muscle recruitment for lumbo-pelvic obliquity with min cues, patient noted glute and calf pain or burning with S/L glute exercises  Exercises:  Exercise #1: supine,seated standing nerve sliders  Comment #1: 5-8 reps every two hours  Exercise #2: Sleep positions Supine or S/L with hip shift  Comment #2: sitting on SB at work  Exercise #3: supine hip lift with/without balloon  Comment #3: abdominal marching 90/90 with rotation OR modifoed belly lift  Exercise #4: R S/L left glte medius and adductor per h/o  Comment #4: L S/L right glute max  Exercise #5: S/L hip lift      Treatment Today     TREATMENT MINUTES COMMENTS   Evaluation     Self-care/ Home management  BEHZAD principles of sitting and issue of footwear recommendation, has low to no arches   Manual therapy 25 As above   Neuromuscular Re-education  NH exercises for muscle recruitment and hip positioning, reduce paradoxical breathing   Therapeutic Activity     Therapeutic Exercises 25 See HEP sequence   Gait training     Modality__________________                Total 50    Blank areas are intentional and mean the treatment did not include these items.       Cheryl Borges, PT  8/9/2017

## 2021-06-12 NOTE — PROGRESS NOTES
[facility] Daily Progress     Patient Name: Casandra Hopkins  Date: 7/26/2017   Visit #: 3  PTA visit #:    Referral Diagnosis: Annular tear  Referring provider: Antoni Enamorado DO  Visit Diagnosis:     ICD-10-CM    1. Radicular syndrome of lower limbs M54.10    2. Degeneration of lumbar intervertebral disc M51.36    3. Myofascial pain M79.1    4. Annular tear of intervertebral disc M51.9    5. Degenerative lumbar disc M51.36          Assessment:   Patient is a PEC postural pattern with BC restrictions, able to reposition thorax/ribs and assist pelvic obliquity with exercises today. Anticipate soreness with activity and patient was cautioned against doing too much quickly.   HEP/POC compliance is  good .  Patient is appropriate to continue with skilled physical therapy intervention, as indicated by initial plan of care.    Goal Status:  Pt. will be independent with home exercise program in : 12 weeks  Pt. will have improved quality of sleep: waking less times/night;in 12 weeks  Patient will ascend / descend: stairs;with railing;with less pain;with less difficulty;in 12 weeks  Patient will transfer: sit/stand;supine/sit;for in/out of bed;for in/out of chair;with less pain;with less difficulty;in 12 weeks  No Data Recorded    Plan / Patient Education:     Continue with initial plan of care.  Progress with home program as tolerated. continue [place] as tolerated. May consider orthotics in future.    Subjective:     Pain Rating: rates LB pain low to medium, just finished work. HAs a total gym at home but doesn't use. Used used stationary bike x 10 minutes yesterday.   Considering yoga or pilates    Objective:   Bilateral HGIR limitations corrected to 90 with hip lift/balloon, right arm reach and left adductor, with bilateral elevated rib cage and rib flare.  Review of sequence of exercises and HEP with correct muscle recruitment for lumbo-pelvic obliquity with min cues, patient noted glute and calf pain or  burning with S/L glute exercises  Exercises:  Exercise #1: supine,seated standing nerve sliders  Comment #1: 5-8 reps every two hours  Exercise #2: Sleep positions Supine or S/L with hip shift  Comment #2: sitting on SB at work  Exercise #3: supine hip lift with/without balloon  Comment #3: abdominal marching 90/90 with rotation OR modifoed belly lift  Exercise #4: R S/L left glte medius and adductor per h/o  Comment #4: L S/L right glute max  Exercise #5: S/L hip lift      Treatment Today     TREATMENT MINUTES COMMENTS   Evaluation     Self-care/ Home management 10 BEHZAD principles of sitting and issue of footwear recommendation, has low to no arches   Manual therapy     Neuromuscular Re-education 15 VA exercises for muscle recruitment and hip positioning, reduce paradoxical breathing   Therapeutic Activity     Therapeutic Exercises 30 See HEP sequence   Gait training     Modality__________________                Total 55    Blank areas are intentional and mean the treatment did not include these items.       Cheryl Borges  7/26/2017

## 2021-06-12 NOTE — PROGRESS NOTES
Assessment/Plan:      Diagnoses and all orders for this visit:    Lumbar spine pain  -     Ambulatory referral to Physical Therapy    Degenerative lumbar disc  -     Ambulatory referral to Physical Therapy    Somatic dysfunction of thoracic region    Somatic dysfunction of lumbar region    Somatic dysfunction of sacroiliac joint    Somatic dysfunction of pelvis region    Somatic dysfunction of lower extremity    Myofascial muscle pain  -     Ambulatory referral to Physical Therapy        Assessment:    1.  Acute flare of lumbar spine pain over the past 5 days with history of chronic lumbar spine pain and left lower extremity pain and paresthesias.  Status post hemilaminectomy with microdiscectomy in September 2012 followed by recurrent disc herniation in December 2012 by Dr. Oswald.  Persistent pain since.   She does have degenerative disc disease at L4-5 and L5-S1 with annular tear at L4-5.  Some moderate foraminal stenosis on the left at L5-S1 no significant nerve compression at any level on MRI from October 2016.  Has had numerous treatments in the past including [facility] physical therapy, multiple injections including transforaminal epidurals, medial branch blocks, SI joints.  Wants to avoid surgery.       2.  Somatic dysfunctions of the thoracic spine, lumbar spine, sacrum, pelvis, lower extremities that contribute to the patient's pain complaints.   3. Pain across the lumbar spine gluteal region consistent with myofascial pain  4.  Poor sleep related to pain, stable         Discussion:    1.  Discussed her progress in physical therapy.  Discussed treatment options.  She again has tried numerous injections wants to avoid interventions and surgery.  2.  Continue with physical therapy.  She has been to 5 visits feels it may be helpful.  I will provide a prescription for 4 more visits  of postural restoration therapy and add pelvic floor strengthening.  She can do this either weekly or every other week and see if  she can make some gains.  If postural restoration therapy is not helpful consideration for aqua therapy trial.      3.  Osteopathic manipulative medicine today.  Patient agrees to proceed.  Please see attached procedure note.  4.  Continue Topamax per psychiatry.  5.  Return to clinic 5 weeks for reevaluation    It was our pleasure caring for your patient today, if there any questions or concerns please do not hesitate to contact us.    15 minutes were spent with this patient in addition to any procedure with greater than 10 minutes in counseling and coordination of care.      Subjective:   Patient ID: Casandra Hopkins is a 32 y.o. female.    History of Present Illness: Patient presents for evaluation of lumbar spine pain.  Has a flare over last weekend naproxen 5-6 days ago.  Had no new trauma.  Increased pain with standing and then [place] of walking and has had severe pain in the gluteal region bilaterally since.  Still has some pain on the left leg but the gluteal region is the worst.  Worse with standing and walking.  Is working with psychiatry.  They increased her Topamax to 100 mg twice daily.  It is slowly and she recently is at 100 mg.  Not feeling is helpful with her sleep or pain at this point or with her mood.  Also doing postural restoration therapy.  Has had 5 sessions.  Notes were reviewed.  Still working at home exercise with the plan of treatment over 12 week..  She missed this week as it had to be rescheduled as well as last week which she feels may have flared her symptoms.  She does feel this is beneficial but not sure how much.  OMT was helpful with last visit.    Imaging: Flexion-extension x-rays reviewed.  No instability from flexion to extension.  Lumbar MRI from 2016 reviewed degenerative disc disease L4-5 and L5-S1 with broad-based disc bulge.  No high-grade nerve impingement.    Review of systems: Still numbness and tingling and nausea.  No bowel or bladder  incontinence, weakness, headache, dizziness,  blurred vision or balance changes.    Past Medical History:   Diagnosis Date     Anxiety      Depression        The following portions of the patient's history were reviewed and updated as appropriate: allergies, current medications, past family history, past medical history, past social history, past surgical history and problem list.      Objective:   Physical Exam:    Vitals:    09/07/17 0851   BP: 98/60       General: Alert and oriented with normal affect. Attention, knowledge, memory, and language are intact. No acute distress.   Eyes: Sclerae are clear.  Respirations: Unlabored. CV: No lower extremity edema.   Gait:  Nonantalgic, cautious   Sensation is intact to light touch throughout the   lower extremities.    Reflexes 2+ patellar and Achilles.     Manual muscle testing reveals:  Right /Left out of 5    5/5 knee flexors  5/5 knee extensors  5/5 ankle plantar flexors  5/5 ankle dorsiflexors  5/5  EHL      Structural exam: . Thoracic spine  T12 rotated right sidebent right. Lumbar spine: L5 rotated left sidebent right. Pelvis:  right innominate upslip, anterior inferior right innominate. Sacrum: Left unilateral sacral shear. Lower extremity: Hypertonic hip flexors bilaterally left greater than right , gluteus medius hypertonicity bilaterally.     Procedure:     After discussing the risks and benefits of osteopathic manipulative medicine, verbal consent was obtained. The somatic dysfunctions listed above were treated with the following techniques:   Thoracic spine: Muscle energy, myofascial release.  Lumbar spine: Muscle energy, soft tissue, myofascial release. Pelvis: Still technique and Isometrics, muscle energy. Sacrum: Myofascial release.  Lower extremities: Muscle energy, myofascial release.    The patient tolerated the procedure well and had improved range of motion in all areas treated prior to leaving the clinic.

## 2021-06-13 NOTE — PROGRESS NOTES
[facility] Daily Progress     Patient Name: Casandra Hopkins  Date: 9/27/2017   Visit #: 6  PTA visit #:    Referral Diagnosis: Annular tear  Referring provider: Antoni Enamorado DO  Visit Diagnosis:     ICD-10-CM    1. Radicular syndrome of lower limbs M54.10    2. Degeneration of lumbar intervertebral disc M51.36    3. Myofascial pain M79.1    4. Annular tear of intervertebral disc M51.9    5. Degenerative lumbar disc M51.36          Assessment:   Patient is a PEC postural pattern with BC restrictions, able to reposition thorax/ribs and assist pelvic obliquity with manual and exercises today. Anticipate soreness with activity and patient was cautioned against doing too much quickly. Sore but safe philosophy and managing stress levels. Patient understands POC and progression of HEP.   HEP/POC compliance is  good .  Patient is appropriate to continue with skilled physical therapy intervention, as indicated by initial plan of care.    Goal Status:  Pt. will be independent with home exercise program in : 12 weeks  Pt. will have improved quality of sleep: waking less times/night;in 12 weeks  Patient will ascend / descend: stairs;with railing;with less pain;with less difficulty;in 12 weeks  Patient will transfer: sit/stand;supine/sit;for in/out of bed;for in/out of chair;with less pain;with less difficulty;in 12 weeks  No Data Recorded    Plan / Patient Education:     Continue with initial plan of care.  Progress with home program as tolerated. continue [place] as tolerated. May consider orthotics in future. All fours belly lift, seated abdominals and yoga postures for safe mobility    Subjective:     Pain Rating: rates LB pain low to medium , just finished work. High levels with stress and mother falling and breaking arm. Considering yoga or pilates    Objective:     Bilateral HGIR 90  Seated nerve sliders and standing leg swings  Review of sequence of exercises and HEP with focus on PEC and  abdominals  Exercises:  Exercise #1: supine, seated and leg swing nerve sliders  Comment #1: 5-8 reps every two hours  Exercise #2: Sleep positions Supine or S/L with hip shift  Comment #2: sitting on SB at work  Exercise #3: supine hip lift with/without balloon  Comment #3: abdominal marching 90/90 with rotation OR modifoed belly lift  Exercise #4: R S/L left glte medius and adductor per h/o  Comment #4: L S/L right glute max  Exercise #5: S/L hip lift  Comment #5: supine synchronized glutes and breathing  Exercise #6: seated abdominals UT #1,2 and 3 and transverse abdominal wall reaches  Comment #6: SB bounces and pelvic circles      Treatment Today     TREATMENT MINUTES COMMENTS   Evaluation     Self-care/ Home management 5 BEHZAD principles of sitting and issue of footwear recommendation, has low to no arches, discussed OTC verses custom   Manual therapy  As above   Neuromuscular Re-education 15 UT exercises for muscle recruitment and hip positioning, reduce paradoxical breathing   Therapeutic Activity     Therapeutic Exercises 30 See HEP sequence   Gait training     Modality__________________                Total 50    Blank areas are intentional and mean the treatment did not include these items.       Cheryl Borges, PT  9/27/2017

## 2021-06-13 NOTE — PROGRESS NOTES
[facility] Daily Progress     Patient Name: Casandra Hopkins  Date: 10/11/2017   Visit #: 8  PTA visit #:    Referral Diagnosis: Annular tear  Referring provider: Antoni Enamorado DO  Visit Diagnosis:     ICD-10-CM    1. Radicular syndrome of lower limbs M54.10    2. Degeneration of lumbar intervertebral disc M51.36    3. Myofascial pain M79.1    4. Annular tear of intervertebral disc M51.9          Assessment:   Patient is able to achieve neutral spine with repositioning exercises however, has not had much change in her lumbar pain. Sore but safe philosophy and managing stress levels that may affect her pain. Patient understands POC and progression of HEP.   HEP/POC compliance is  good .  Patient is appropriate to continue with skilled physical therapy intervention, as indicated by initial plan of care.    Goal Status:  Pt. will be independent with home exercise program in : 12 weeks  Pt. will have improved quality of sleep: waking less times/night;in 12 weeks  Patient will ascend / descend: stairs;with railing;with less pain;with less difficulty;in 12 weeks  Patient will transfer: sit/stand;supine/sit;for in/out of bed;for in/out of chair;with less pain;with less difficulty;in 12 weeks      Plan / Patient Education:     Continue with initial plan of care.  Progress with home program as tolerated. continue AK as tolerated. May consider orthotics in future. All fours progression of abdominals and lumbar stabilization, AK for left lower extremity weakness/integration. Lateral/retro stairs vs SB    Subjective:     Pain Rating: rates LB pain less then medium, stress at work. Had sleep study, also found     Objective:     Focus on HEP today, cues for correct muscle recruitment    1. Supine hip lift with/without balloon  2. Marching abdominal with towel roll under tail bone, belly breathing(CALM YOUR SYSTEM DOWN)  3. Side lying hip lift/plank, modified plank and or plank with feet against wall  4. Belly over  pillow glute squeezes and hamstring curls (posterior chain of muscles)  5. Modified belly lift/progress to all fours arm/legs  6. Wall reach      Exercises:  Exercise #1: supine, seated and leg swing nerve sliders  Exercise #3: supine hip lift with/without balloon  Comment #3: abdominal marching 90/90 with rotation OR modifed belly lift  Exercise #4: R S/L left glte medius and adductor per h/o  Comment #4: L S/L right glute max  Exercise #5: S/L hip lift  Comment #5: supine synchronized glutes and breathing  Exercise #6: seated abdominals OH #1,2 and 3 and transverse abdominal wall reaches  Comment #6: SB bounces and pelvic circles  Exercise #7: Quadruped all fours with abdominals  Comment #7: prone hamstring curls with ball      Treatment Today     TREATMENT MINUTES COMMENTS   Evaluation     Self-care/ Home management  BEHZAD principles of sitting and issue of footwear recommendation, has low to no arches, discussed OTC verses custom   Manual therapy     Neuromuscular Re-education 15 OH exercises for muscle recruitment and hip positioning, reduce paradoxical breathing   Therapeutic Activity     Therapeutic Exercises 30 See HEP sequence   Gait training     Modality__________________                Total 45    Blank areas are intentional and mean the treatment did not include these items.       Cheryl Borges, PT  10/11/2017

## 2021-06-13 NOTE — PROGRESS NOTES
[facility] Discharge Summary  Patient Name: Casandra Hopkins  Date: 12/6/2017  Referral Diagnosis: Chronic back pain, postural dysfunction  Referring provider: Antoni Enamorado, DO  Visit Diagnosis:   1. Radicular syndrome of lower limbs     2. Degeneration of lumbar intervertebral disc     3. Myofascial pain     4. Annular tear of intervertebral disc           Patient was seen for 9 visits from 7/10/17 to 10/25/17 with 0 missed appointments.  The patient met goals and has demonstrated understanding of and independence in the home program for self-care, and progression to next steps.  She will initiate contact if questions or concerns arise.  No further therapy is required at this time.    Therapy will be discontinued at this time.  The patient will need a new referral to resume.    Thank you for your referral.  Cheryl Borges, PT  12/6/2017  10:29 AM    [facility] Daily Progress     Patient Name: Casandra Hopkins  Date: 10/25/2017   Visit #: 9  PTA visit #:    Referral Diagnosis: Annular tear  Referring provider: Antoni Enamorado,   Visit Diagnosis:     ICD-10-CM    1. Radicular syndrome of lower limbs M54.10    2. Degeneration of lumbar intervertebral disc M51.36    3. Myofascial pain M79.1    4. Annular tear of intervertebral disc M51.9          Assessment:   Patient has made improvements with biomechanical/muscle imbalances and now needs lumbar/core strengthening. She feels she has had a reduction in her low back pain, is able to self correct to relieve and reposition for neutral spine. She is essentially holding neutral between sessions. She does have sleep apnea and will start a c-pap machine. I anticipate this may give her additional relief of her chronic pain symptoms with adequate rest.   HEP/POC compliance is  good .  Patient is appropriate to continue with skilled physical therapy intervention, as indicated by initial plan of care.    Goal Status: partially met  Pt. will be  independent with home exercise program in : 12 weeks  Pt. will have improved quality of sleep: waking less times/night;in 12 weeks  Patient will ascend / descend: stairs;with railing;with less pain;with less difficulty;in 12 weeks  Patient will transfer: sit/stand;supine/sit;for in/out of bed;for in/out of chair;with less pain;with less difficulty;in 12 weeks      Plan / Patient Education:     Continue with initial plan of care.  Progress with home program as tolerated.  May consider orthotics in future.Space appointments 2-3 weeks, start C-pap, may introduce TENS if continues to need pain control methods    Subjective:     Pain Rating: rates LB pain less then medium, stress at work. Had sleep study,has sleep apnea, getting machine    Objective:   Bilateral HGIR neutral  Focus on HEP today, cues for correct muscle recruitment    Yoga flow postures and positions for abdominal/lumbar stabilization with movement and strengthening, stress reduction, mild correction with form    For neutral Spine Repositioning  1. Supine hip lift with/without balloon,   2.abdominal marching  3. wall reach    Strengthening  1.  Marching abdominal with towel roll under tail bone, belly breathing, abdominals strengthening table top hold and breathe(CALM YOUR SYSTEM DOWN)  2. Bidge with ball, feel glutes hold 15 seconds x 5 reps  3. Side lying hip lift/plank, modified plank and or plank with feet against wall  4. Belly over pillow glute squeezes and hamstring curls (posterior chain of muscles)  5. Modified belly lift/progress to all fours arm/legs  6 dolphin planks          Exercises:  Exercise #1: supine, seated and leg swing nerve sliders  Exercise #3: supine hip lift with/without balloon  Comment #3: abdominal marching 90/90 with rotation OR modifed belly lift  Exercise #4: R S/L left glte medius and adductor per h/o  Comment #4: L S/L right glute max  Exercise #5: S/L hip lift  Comment #5: supine synchronized glutes and  breathing  Exercise #6: seated abdominals MO #1,2 and 3 and transverse abdominal wall reaches  Comment #6: SB bounces and pelvic circles  Exercise #7: Quadruped all fours with abdominals  Comment #7: prone hamstring curls with ball      Treatment Today     TREATMENT MINUTES COMMENTS   Evaluation     Self-care/ Home management  BEHZAD principles of sitting and issue of footwear recommendation, has low to no arches, discussed OTC verses custom   Manual therapy     Neuromuscular Re-education 15 MO exercises for muscle recruitment and hip positioning, reduce paradoxical breathing, abdominal re-training   Therapeutic Activity     Therapeutic Exercises 35 See HEP sequence   Gait training     Modality__________________                Total 50    Blank areas are intentional and mean the treatment did not include these items.       Cheryl Borges, PT  10/25/2017

## 2021-06-13 NOTE — PROGRESS NOTES
"[facility] Daily Progress     Patient Name: Casandra Hopkins  Date: 10/4/2017   Visit #: 7  PTA visit #:    Referral Diagnosis: Annular tear  Referring provider: Antoni Enamorado DO  Visit Diagnosis:     ICD-10-CM    1. Radicular syndrome of lower limbs M54.10    2. Degeneration of lumbar intervertebral disc M51.36    3. Myofascial pain M79.1    4. Annular tear of intervertebral disc M51.9    5. Degenerative lumbar disc M51.36          Assessment:   Patient is doing well, holding neutral. Decreased pain following session today. Sore but safe philosophy and managing stress levels that may affect her pain. Patient understands POC and progression of HEP.   HEP/POC compliance is  good .  Patient is appropriate to continue with skilled physical therapy intervention, as indicated by initial plan of care.    Goal Status:  Pt. will be independent with home exercise program in : 12 weeks  Pt. will have improved quality of sleep: waking less times/night;in 12 weeks  Patient will ascend / descend: stairs;with railing;with less pain;with less difficulty;in 12 weeks  Patient will transfer: sit/stand;supine/sit;for in/out of bed;for in/out of chair;with less pain;with less difficulty;in 12 weeks  No Data Recorded    Plan / Patient Education:     Continue with initial plan of care.  Progress with home program as tolerated. continue NM as tolerated. May consider orthotics in future. All fours progression of abdominals and lumbar stabilization, NM for left lower extremity weakness/integration. Lateral/retro stairs    Subjective:     Pain Rating: rates LB pain medium, stress at work.. Considering yoga or pilates    Objective:   Ambulates without much arm swing-discussed normal gait cycle and arm swing  Bilateral HGIR 90  Foam roller for thoracic self mobilization  Hooklying partial curl up x 3 reps 30\" each  All fours quadruped arm/leg with cues for position and lumbar/pelvic alignment without rotation-issued  Prone " abs/glutes and hamstrings with orange band-issued    Manual: prone over pillow STM, P/A thoracic mobes grade 1, STM of paraspinals and lumbar, gluteals    Exercises:  Exercise #1: supine, seated and leg swing nerve sliders  Comment #1: 5-8 reps every two hours  Exercise #2: Sleep positions Supine or S/L with hip shift  Comment #2: sitting on SB at work  Exercise #3: supine hip lift with/without balloon  Comment #3: abdominal marching 90/90 with rotation OR modifed belly lift  Exercise #4: R S/L left glte medius and adductor per h/o  Comment #4: L S/L right glute max  Exercise #5: S/L hip lift  Comment #5: supine synchronized glutes and breathing  Exercise #6: seated abdominals MO #1,2 and 3 and transverse abdominal wall reaches  Comment #6: SB bounces and pelvic circles  Exercise #7: Quadruped all fours with abdominals  Comment #7: prone hamstring curls with ball      Treatment Today     TREATMENT MINUTES COMMENTS   Evaluation     Self-care/ Home management  BEHZAD principles of sitting and issue of footwear recommendation, has low to no arches, discussed OTC verses custom   Manual therapy 15 As above   Neuromuscular Re-education 15 MO exercises for muscle recruitment and hip positioning, reduce paradoxical breathing   Therapeutic Activity     Therapeutic Exercises 30 See HEP sequence   Gait training     Modality__________________                Total 60    Blank areas are intentional and mean the treatment did not include these items.       Cheryl Borges, PT  10/4/2017

## 2021-06-27 ENCOUNTER — HOSPITAL ENCOUNTER (EMERGENCY)
Dept: EMERGENCY MEDICINE | Facility: CLINIC | Age: 36
Discharge: HOME | End: 2021-06-27
Attending: STUDENT IN AN ORGANIZED HEALTH CARE EDUCATION/TRAINING PROGRAM
Payer: COMMERCIAL

## 2021-06-27 DIAGNOSIS — R29.898 LEFT LEG WEAKNESS: ICD-10-CM

## 2021-07-03 NOTE — ADDENDUM NOTE
Addendum Note by Antoni Enamorado DO at 6/5/2017 11:59 PM     Author: Antoni Enamorado DO Service: -- Author Type: Physician    Filed: 6/8/2017  1:57 PM Date of Service: 6/5/2017 11:59 PM Status: Signed    : Antoni Enamorado DO (Physician)    Encounter addended by: Antoni Enamorado DO on: 6/8/2017  1:57 PM<BR>     Actions taken: Sign clinical note

## 2021-07-05 PROBLEM — F11.20 OPIOID DEPENDENCE, UNCOMPLICATED (H): Status: ACTIVE | Noted: 2021-05-17

## 2021-07-05 PROBLEM — M48.062 SPINAL STENOSIS OF LUMBAR REGION WITH NEUROGENIC CLAUDICATION: Status: ACTIVE | Noted: 2021-06-27

## 2021-07-06 VITALS — BODY MASS INDEX: 29.24 KG/M2 | WEIGHT: 198 LBS

## 2021-07-07 NOTE — ED PROVIDER NOTES
EMERGENCY DEPARTMENT ENCOUNTER      NAME: Casandra Hopkins  AGE: 36 y.o. female  YOB: 1985  MRN: [id number]  EVALUATION DATE & TIME: 2021 12:37 PM    PCP: Provider, No Primary Care    ED PROVIDER: Kandy Rockwell M.D.    No chief complaint on file.      FINAL IMPRESSION:  1. Left leg weakness        ED COURSE & MEDICAL DECISION MAKIN y.o. old female who presents to the ED for evaluation of back pain, leg weakness.   History and physical examination as documented. Vital signs reassuring.     This patient has had multiple lumbar fusions and revisions.  The most recent one was approximately a month ago.  She has been having numbness of bilateral lower extremities since the surgery.  She is already on steroids.  She has followed up with spine and has had a postop MRI.  She presents because she is having some left leg weakness that is new for her.  She has not had any trauma or injury.  No fevers.  She is not an IV drug user.  Has findings of left lower extremity weakness on my exam.  Even though she is had a recent MRI I think we do need to get an MRI of her L-spine.     This did not show any new findings.  No cord compression.  I reviewed the MRI and patient's presentation with the surgeon that was on-call for [facility] spine.  He is comfortable with her being discharged home.  Patient is also comfortable with this.  He wanted patient to call the clinic for follow-up with Dr. Daniels.  Patient is agreeable with this.  Given return precautions, discharged in stable condition.    Scribe time stamps  12:48 PM I met with the patient for the initial interview and physical examination. Discussed plan for treatment and workup in the ED. PPE: Provider wore gloves and paper mask.  4:09 PM I rechecked the patient and discussed findings. Discussed with the patient and all questioned fully answered.     0 minutes of critical care time     MEDICATIONS GIVEN IN THE EMERGENCY:  Medications   diazePAM tablet 5  mg (VALIUM) (5 mg Oral Given 6/27/21 1329)   oxyCODONE immediate release tablet 5 mg (ROXICODONE) (5 mg Oral Given 6/27/21 1329)   gadobutroL 10 mmol/10 mL (1 mmol/mL) injection 9 mL (GADAVIST) (9 mL Intravenous Given 6/27/21 1432)       NEW PRESCRIPTIONS STARTED AT TODAY'S ER VISIT  Discharge Medication List as of 6/27/2021  4:28 PM      CONTINUE these medications which have NOT CHANGED    Details   ALPRAZolam (XANAX) 0.5 MG tablet TAKE ONE TABLET BY MOUTH THREE TIMES DAILY AS NEEDED, Historical Med      buPROPion (WELLBUTRIN) 75 MG tablet Take 300 mg by mouth daily., Until Discontinued, Historical Med      cyclobenzaprine (FLEXERIL) 10 MG tablet Take 1 tablet (10 mg total) by mouth 2 (two) times a day as needed for muscle spasms., Starting Wed 11/21/2018, Print      naproxen (NAPROSYN) 375 MG tablet Take 1 tablet (375 mg total) by mouth 2 (two) times a day with meals., Starting Wed 11/21/2018, Print      oxyCODONE-acetaminophen (PERCOCET/ENDOCET) 5-325 mg per tablet Take 1 tablet by mouth every 4 (four) hours as needed for pain., Starting Wed 11/21/2018, Print      topiramate (TOPAMAX) 25 MG tablet TAKE 1 TABLET BY MOUTH TWICE DAILY, Normal      venlafaxine (EFFEXOR) 37.5 MG tablet Take 37.5 mg by mouth daily., Until Discontinued, Historical Med              =================================================================    HPI    Patient information was obtained from: the patient    Use of : N/A    Casandra Hopkins is a 36 y.o. female with a pertinent history of DDD of lumbar, spinal stenosis of spinal region who presents to this ED by personal vehicle for evaluation of leg numbness and weakness.    Per chart review, the patient had surgery for spinal stenosis of the lumbar region on 5/21/21 at [facility]. The patient had a stable post operative course. Standard prophylactic antibiotics were administered for 24 hours post surgery and the patient received DVT prophylaxis per service  protocol. The patient's pain was initially controlled on intravenous pain medications and then weaned to oral medications prior to discharge. The patients pain was well controlled and they met mobility expectations appropriate for the discharge location. The patient has normal bowel and bladder function. The incision is clean, dry and intact.     Per MRI report provided by the patient.  The patient received a Lumbar Spinal MRI without Contrast which showed scattered foci of heterogenous fluid in dorsolateral soft tissue likely postoperative seroma/hematoma.  There is also marked increased STIR hyperintense signal in dorsal paraspinal musculature likely postoperative.  No upper lumbar disc herniation or neural impingement.    The patient presents to the ED with worsening leg numbness and weakness post operation.  She notes she normal has some numbness after her spinal surgeries, but was concerned when the numbness spread to her pelvis and worsened accompanied by leg weakness.  She explained that her numbness and tingling increased after 6/21/21 and spread into her feet, legs, pelvic region, and buttocks making it hard for her to sleep.  Last night, the patient began to experience leg weakness especially in the left leg causing a gait problem.  In the ED, the patient notes her legs are shaking.  The patient also reports chronic back pain for which she takes Oxycodone and steroids.  The patient denies the use of drugs or tobacco, but does occasionally drink.  The patient is not vaccinated for COVID-19.     The patient denies urinary symptoms, bowel changes, fever, chest pain, abdominal pain, and any other symptoms or complaints at this time.      REVIEW OF SYSTEMS   Review of Systems   Constitutional: Negative for fever.   Cardiovascular: Negative for chest pain.   Gastrointestinal: Negative for abdominal pain, blood in stool, constipation and diarrhea.   Genitourinary: Negative for decreased urine volume, difficulty  urinating, dysuria, enuresis, frequency, hematuria and urgency.   Musculoskeletal: Positive for back pain (chronic) and gait problem.        Positive for legs shaking.   Neurological: Positive for weakness (bilateral leg, especially left leg) and numbness (legs, feet, pelvis, buttocks - and tingling).   Psychiatric/Behavioral: Positive for sleep disturbance.   All other systems reviewed and are negative.       PAST MEDICAL HISTORY:  Past Medical History:   Diagnosis Date     ADHD      Anxiety      Calculus of kidney      Depression      Generalized hyperhidrosis      Lumbar disc herniation, left      CARLITO (obstructive sleep apnea)      Vitamin D deficiency        PAST SURGICAL HISTORY:  Past Surgical History:   Procedure Laterality Date     Laminectomy at L5-S1        Lumbar Decompression at L5-S1         CURRENT MEDICATIONS:    No current facility-administered medications on file prior to encounter.      Current Outpatient Medications on File Prior to Encounter   Medication Sig     ALPRAZolam (XANAX) 0.5 MG tablet TAKE ONE TABLET BY MOUTH THREE TIMES DAILY AS NEEDED     buPROPion (WELLBUTRIN) 75 MG tablet Take 300 mg by mouth daily.     cyclobenzaprine (FLEXERIL) 10 MG tablet Take 1 tablet (10 mg total) by mouth 2 (two) times a day as needed for muscle spasms.     naproxen (NAPROSYN) 375 MG tablet Take 1 tablet (375 mg total) by mouth 2 (two) times a day with meals.     oxyCODONE-acetaminophen (PERCOCET/ENDOCET) 5-325 mg per tablet Take 1 tablet by mouth every 4 (four) hours as needed for pain.     topiramate (TOPAMAX) 25 MG tablet TAKE 1 TABLET BY MOUTH TWICE DAILY     venlafaxine (EFFEXOR) 37.5 MG tablet Take 37.5 mg by mouth daily.       ALLERGIES:  Allergies   Allergen Reactions     Sulfa (Sulfonamide Antibiotics) Unknown     Vioxx [Rofecoxib] Unknown       FAMILY HISTORY:  Family History   Problem Relation Age of Onset     Heart disease Father      Alcohol abuse Father      Diabetes Father      Alcohol abuse  Sister      Cancer Maternal Aunt      Cancer Paternal Uncle        SOCIAL HISTORY:   Social History     Socioeconomic History     Marital status: Single     Spouse name: Not on file     Number of children: Not on file     Years of education: Not on file     Highest education level: Not on file   Occupational History     Not on file   Social Needs     Financial resource strain: Not on file     Food insecurity     Worry: Not on file     Inability: Not on file     Transportation needs     Medical: Not on file     Non-medical: Not on file   Tobacco Use     Smoking status: Former Smoker   Substance and Sexual Activity     Alcohol use: Yes     Drug use: No     Sexual activity: Yes     Partners: Male     Birth control/protection: I.U.D.   Lifestyle     Physical activity     Days per week: Not on file     Minutes per session: Not on file     Stress: Not on file   Relationships     Social connections     Talks on phone: Not on file     Gets together: Not on file     Attends Church service: Not on file     Active member of club or organization: Not on file     Attends meetings of clubs or organizations: Not on file     Relationship status: Not on file     Intimate partner violence     Fear of current or ex partner: Not on file     Emotionally abused: Not on file     Physically abused: Not on file     Forced sexual activity: Not on file   Other Topics Concern     Not on file   Social History Narrative     Not on file       VITALS:  Patient Vitals for the past 24 hrs:   BP Temp Pulse Resp SpO2 Weight   06/27/21 1536 -- -- -- 18 -- --   06/27/21 1520 111/56 -- 86 -- 98 % --   06/27/21 1358 126/57 -- 80 -- 98 % --   06/27/21 1234 -- 99  F (37.2  C) (!) 106 16 99 % 198 lb (89.8 kg)       PHYSICAL EXAM    General: the patient is uncomfortable and anxious appearing.  HEENT: No external signs of head trauma. Oropharynx clear and moist.  Chest/Pulm: Lungs clear to auscultation bilaterally. No respiratory distress. Breathing  comfortably on room air.   CV: Regular rate and rhythm without murmurs. Warm and well perfused.   Abdomen: Soft and non-distended without tenderness to palpation. No peritoneal signs.   MSK/Extremities: Actively moving all four extremities. No pedal edema. Midline lumbar incision that is clean/dry/intact.  Skin: No visible rashes. Not diaphoretic.   Neuro: Alert and conversant. GCS 15.  Right leg 5/5 strength.  Bilateral leg sensation to touch diminished.  Weakness with hip flexion of left lower extremity.  Weakness with plantar flexion and dorsiflexion of left lower extremity..  Psych: Behavior normal.    LAB:  All pertinent labs reviewed and interpreted.  No results found for this visit on 06/27/21.    RADIOLOGY:  Reviewed all pertinent imaging. Please see official radiology report.  Mr Lumbar Spine With Without Contrast    Result Date: 6/27/2021  EXAM: MR LUMBAR SPINE W WO CONTRAST LOCATION: [facility] DATE/TIME: 6/27/2021 2:50 PM INDICATION: Bilateral leg numbness with saddle anesthesia. New onset weakness of the left lower extremity COMPARISON: 6/18/2021 MRI, plain film 3/4/2021. CONTRAST: Gadavist 9 mL. TECHNIQUE: Routine Lumbar Spine MRI without and with IV contrast. FINDINGS: Nomenclature is based on 5 lumbar type vertebral bodies. Again seen are the postoperative changes of anterior interbody fusion across L4-L5 and L5-S1 with additional screw fixation across L4-L5 ventrally. Previously there had been posterior bilateral pedicle screws and rods across L4-L5-S1 which had been removed, screw residua tracks are identified. There is also heterogeneous signal change in the posterior paraspinal soft tissues as expected with post instrumentation change. There is no definite/significant extraspinal fluid collection, abscess, or hematoma. There is no evidence of discitis or osteomyelitis and nothing to suggest epidural abscess, hematoma, or arachnoiditis. Heterogeneous STIR signal changes  in the posterior paraspinal soft tissues are consistent with prior instrumentation change. There is 3 to 4 mm of retrolisthesis at L4-L5, otherwise anatomic alignment is maintained. Vertebral body heights are maintained. No focal pathologic marrow replacement. No abnormal osseous or paraspinal ligamentous enhancement and no abnormal intracanalicular enhancement. Normal distal spinal cord and cauda equina with conus medullaris at L1. No significant extraspinal abnormality otherwise identified. Unremarkable visualized bony pelvis. T12-L1: Disc, facets, canal and foramina within normal limits. L1-L2: Disc, facets, canal and foramina within normal limits. L2-L3: Disc within normal limits. Slight facet hypertrophy bilaterally. Canal and foramina patent. L3-L4: Trace of disc bulging. Mild facet hypertrophy bilaterally. Posterior spinal lipomatosis. No significant spinal canal or neural foraminal stenosis. L4-L5: Postoperative change, mild disc osteophyte complex. Minimal facet arthropathy. Canal patent, mild bilateral neural foraminal encroachment. L5-S1: Postoperative change. Canal and foramina patent.     1.  No evidence of new acute lumbar spine pathology, no evidence of discitis, osteomyelitis, epidural abscess or hematoma, no paraspinal abscess or hematoma, and no evidence of arachnoiditis. 2.  Status post anterior interbody fusion L4-L5-S1. 3.  Status post laminectomies L4-L5-S1 and prior removal of bilateral pedicle screws across L4-L5-S1 with track residua and posterior paraspinal soft tissue signal changes and enhancement as expected. 4.  Minimal degenerative spondylosis changes otherwise present, see report for level by level details.      EKG:    None    PROCEDURES:   None    I, Gasper Melendez, am serving as a scribe to document services personally performed by Dr. Rockwell based on my observation and the provider's statements to me. I, Kandy Rockwell MD attest that Gasper Melendez is acting in a scribe capacity, has  observed my performance of the services and has documented them in accordance with my direction.    Kandy Rockwell M.D.  Emergency Medicine  [facility] EMERGENCY ROOM  [address] [facility] [address]  Dept: [phone]  Loc: [phone]     Kandy Rockwell MD  06/27/21 7761

## 2025-07-25 ENCOUNTER — HOSPITAL ENCOUNTER (EMERGENCY)
Facility: CLINIC | Age: 40
Discharge: HOME | End: 2025-07-25
Admitting: STUDENT IN AN ORGANIZED HEALTH CARE EDUCATION/TRAINING PROGRAM
Payer: COMMERCIAL

## 2025-07-25 ENCOUNTER — APPOINTMENT (OUTPATIENT)
Dept: RADIOLOGY | Facility: CLINIC | Age: 40
End: 2025-07-25
Attending: EMERGENCY MEDICINE
Payer: COMMERCIAL

## 2025-07-25 VITALS
HEART RATE: 85 BPM | OXYGEN SATURATION: 97 % | WEIGHT: 200 LBS | BODY MASS INDEX: 29.53 KG/M2 | RESPIRATION RATE: 16 BRPM | SYSTOLIC BLOOD PRESSURE: 102 MMHG | DIASTOLIC BLOOD PRESSURE: 66 MMHG | TEMPERATURE: 99.3 F

## 2025-07-25 DIAGNOSIS — M25.471 SWELLING OF BOTH ANKLES: Primary | ICD-10-CM

## 2025-07-25 DIAGNOSIS — M25.472 SWELLING OF BOTH ANKLES: Primary | ICD-10-CM

## 2025-07-25 LAB
ALBUMIN SERPL BCG-MCNC: 4 G/DL (ref 3.5–5.2)
ALP SERPL-CCNC: 83 U/L (ref 40–150)
ALT SERPL W P-5'-P-CCNC: 43 U/L (ref 0–50)
ANION GAP SERPL CALCULATED.3IONS-SCNC: 13 MMOL/L (ref 7–15)
AST SERPL W P-5'-P-CCNC: 37 U/L (ref 0–45)
ATRIAL RATE - MUSE: 92 BPM
BASOPHILS # BLD AUTO: 0 10E3/UL (ref 0–0.2)
BASOPHILS NFR BLD AUTO: 1 %
BILIRUB SERPL-MCNC: <0.2 MG/DL
BILIRUBIN DIRECT (ROCHE PRO & PURE): <0.08 MG/DL (ref 0–0.45)
BUN SERPL-MCNC: 11.6 MG/DL (ref 6–20)
CALCIUM SERPL-MCNC: 9.2 MG/DL (ref 8.8–10.4)
CHLORIDE SERPL-SCNC: 103 MMOL/L (ref 98–107)
CREAT SERPL-MCNC: 0.86 MG/DL (ref 0.51–0.95)
D DIMER PPP FEU-MCNC: 0.37 UG/ML FEU (ref 0–0.5)
DIASTOLIC BLOOD PRESSURE - MUSE: NORMAL MMHG
EGFRCR SERPLBLD CKD-EPI 2021: 86 ML/MIN/1.73M2
EOSINOPHIL # BLD AUTO: 0.1 10E3/UL (ref 0–0.7)
EOSINOPHIL NFR BLD AUTO: 1 %
ERYTHROCYTE [DISTWIDTH] IN BLOOD BY AUTOMATED COUNT: 14.7 % (ref 10–15)
GLUCOSE SERPL-MCNC: 103 MG/DL (ref 70–99)
HCO3 SERPL-SCNC: 23 MMOL/L (ref 22–29)
HCT VFR BLD AUTO: 36.7 % (ref 35–47)
HGB BLD-MCNC: 11.8 G/DL (ref 11.7–15.7)
IMM GRANULOCYTES # BLD: 0 10E3/UL
IMM GRANULOCYTES NFR BLD: 0 %
INTERPRETATION ECG - MUSE: NORMAL
LYMPHOCYTES # BLD AUTO: 2.1 10E3/UL (ref 0.8–5.3)
LYMPHOCYTES NFR BLD AUTO: 30 %
MCH RBC QN AUTO: 25.4 PG (ref 26.5–33)
MCHC RBC AUTO-ENTMCNC: 32.2 G/DL (ref 31.5–36.5)
MCV RBC AUTO: 79 FL (ref 78–100)
MONOCYTES # BLD AUTO: 0.5 10E3/UL (ref 0–1.3)
MONOCYTES NFR BLD AUTO: 8 %
NEUTROPHILS # BLD AUTO: 4.2 10E3/UL (ref 1.6–8.3)
NEUTROPHILS NFR BLD AUTO: 61 %
NRBC # BLD AUTO: 0 10E3/UL
NRBC BLD AUTO-RTO: 0 /100
NT-PROBNP SERPL-MCNC: 63 PG/ML (ref 0–178)
P AXIS - MUSE: 49 DEGREES
PLATELET # BLD AUTO: 250 10E3/UL (ref 150–450)
POTASSIUM SERPL-SCNC: 3.8 MMOL/L (ref 3.4–5.3)
PR INTERVAL - MUSE: 136 MS
PROT SERPL-MCNC: 6.7 G/DL (ref 6.4–8.3)
QRS DURATION - MUSE: 84 MS
QT - MUSE: 376 MS
QTC - MUSE: 464 MS
R AXIS - MUSE: 31 DEGREES
RBC # BLD AUTO: 4.64 10E6/UL (ref 3.8–5.2)
SODIUM SERPL-SCNC: 139 MMOL/L (ref 135–145)
SYSTOLIC BLOOD PRESSURE - MUSE: NORMAL MMHG
T AXIS - MUSE: 44 DEGREES
TROPONIN T SERPL HS-MCNC: <6 NG/L
VENTRICULAR RATE- MUSE: 92 BPM
WBC # BLD AUTO: 6.9 10E3/UL (ref 4–11)

## 2025-07-25 PROCEDURE — 71046 X-RAY EXAM CHEST 2 VIEWS: CPT

## 2025-07-25 PROCEDURE — 82248 BILIRUBIN DIRECT: CPT | Performed by: EMERGENCY MEDICINE

## 2025-07-25 PROCEDURE — 80048 BASIC METABOLIC PNL TOTAL CA: CPT | Performed by: EMERGENCY MEDICINE

## 2025-07-25 PROCEDURE — 96360 HYDRATION IV INFUSION INIT: CPT

## 2025-07-25 PROCEDURE — 36415 COLL VENOUS BLD VENIPUNCTURE: CPT | Performed by: STUDENT IN AN ORGANIZED HEALTH CARE EDUCATION/TRAINING PROGRAM

## 2025-07-25 PROCEDURE — 83880 ASSAY OF NATRIURETIC PEPTIDE: CPT | Performed by: EMERGENCY MEDICINE

## 2025-07-25 PROCEDURE — 93005 ELECTROCARDIOGRAM TRACING: CPT | Performed by: EMERGENCY MEDICINE

## 2025-07-25 PROCEDURE — 85004 AUTOMATED DIFF WBC COUNT: CPT | Performed by: EMERGENCY MEDICINE

## 2025-07-25 PROCEDURE — 99285 EMERGENCY DEPT VISIT HI MDM: CPT | Mod: 25

## 2025-07-25 PROCEDURE — 36415 COLL VENOUS BLD VENIPUNCTURE: CPT | Performed by: EMERGENCY MEDICINE

## 2025-07-25 PROCEDURE — 258N000003 HC RX IP 258 OP 636: Performed by: STUDENT IN AN ORGANIZED HEALTH CARE EDUCATION/TRAINING PROGRAM

## 2025-07-25 PROCEDURE — 84484 ASSAY OF TROPONIN QUANT: CPT | Performed by: STUDENT IN AN ORGANIZED HEALTH CARE EDUCATION/TRAINING PROGRAM

## 2025-07-25 PROCEDURE — 85379 FIBRIN DEGRADATION QUANT: CPT | Performed by: STUDENT IN AN ORGANIZED HEALTH CARE EDUCATION/TRAINING PROGRAM

## 2025-07-25 RX ADMIN — SODIUM CHLORIDE 1000 ML: 0.9 INJECTION, SOLUTION INTRAVENOUS at 18:04

## 2025-07-25 ASSESSMENT — ACTIVITIES OF DAILY LIVING (ADL): ADLS_ACUITY_SCORE: 41

## 2025-07-25 ASSESSMENT — COLUMBIA-SUICIDE SEVERITY RATING SCALE - C-SSRS
6. HAVE YOU EVER DONE ANYTHING, STARTED TO DO ANYTHING, OR PREPARED TO DO ANYTHING TO END YOUR LIFE?: NO
1. IN THE PAST MONTH, HAVE YOU WISHED YOU WERE DEAD OR WISHED YOU COULD GO TO SLEEP AND NOT WAKE UP?: NO
2. HAVE YOU ACTUALLY HAD ANY THOUGHTS OF KILLING YOURSELF IN THE PAST MONTH?: NO